# Patient Record
Sex: MALE | Race: WHITE | NOT HISPANIC OR LATINO | Employment: PART TIME | URBAN - METROPOLITAN AREA
[De-identification: names, ages, dates, MRNs, and addresses within clinical notes are randomized per-mention and may not be internally consistent; named-entity substitution may affect disease eponyms.]

---

## 2019-02-22 ENCOUNTER — OFFICE VISIT (OUTPATIENT)
Dept: GASTROENTEROLOGY | Facility: CLINIC | Age: 59
End: 2019-02-22
Payer: COMMERCIAL

## 2019-02-22 VITALS
SYSTOLIC BLOOD PRESSURE: 148 MMHG | HEIGHT: 70 IN | WEIGHT: 256 LBS | HEART RATE: 76 BPM | BODY MASS INDEX: 36.65 KG/M2 | DIASTOLIC BLOOD PRESSURE: 94 MMHG

## 2019-02-22 DIAGNOSIS — R10.13 EPIGASTRIC PAIN: Primary | ICD-10-CM

## 2019-02-22 DIAGNOSIS — K76.0 FATTY LIVER: ICD-10-CM

## 2019-02-22 DIAGNOSIS — Z12.11 COLON CANCER SCREENING: ICD-10-CM

## 2019-02-22 DIAGNOSIS — K29.50 CHRONIC GASTRITIS WITHOUT BLEEDING, UNSPECIFIED GASTRITIS TYPE: ICD-10-CM

## 2019-02-22 DIAGNOSIS — Z86.010 HISTORY OF COLON POLYPS: ICD-10-CM

## 2019-02-22 DIAGNOSIS — K21.9 GASTROESOPHAGEAL REFLUX DISEASE WITHOUT ESOPHAGITIS: ICD-10-CM

## 2019-02-22 PROCEDURE — 99214 OFFICE O/P EST MOD 30 MIN: CPT | Performed by: INTERNAL MEDICINE

## 2019-02-22 RX ORDER — PANTOPRAZOLE SODIUM 40 MG/1
40 TABLET, DELAYED RELEASE ORAL DAILY
Refills: 6 | COMMUNITY
Start: 2019-01-26 | End: 2019-02-22 | Stop reason: SDUPTHER

## 2019-02-22 RX ORDER — LISINOPRIL 10 MG/1
10 TABLET ORAL DAILY
Refills: 11 | COMMUNITY
Start: 2019-01-13

## 2019-02-22 RX ORDER — PAROXETINE HYDROCHLORIDE 20 MG/1
20 TABLET, FILM COATED ORAL
Refills: 2 | COMMUNITY
Start: 2019-01-23

## 2019-02-22 RX ORDER — PANTOPRAZOLE SODIUM 40 MG/1
40 TABLET, DELAYED RELEASE ORAL DAILY
Qty: 30 TABLET | Refills: 12 | Status: SHIPPED | OUTPATIENT
Start: 2019-02-22 | End: 2020-12-23 | Stop reason: SDUPTHER

## 2019-02-22 RX ORDER — FAMOTIDINE 40 MG/1
40 TABLET, FILM COATED ORAL
Qty: 30 TABLET | Refills: 12 | Status: SHIPPED | OUTPATIENT
Start: 2019-02-22 | End: 2021-02-10

## 2019-02-22 RX ORDER — METOPROLOL SUCCINATE 25 MG/1
12.5 TABLET, EXTENDED RELEASE ORAL DAILY
Refills: 1 | COMMUNITY
Start: 2019-01-07

## 2019-02-22 RX ORDER — SUCRALFATE 1 G/1
TABLET ORAL
Refills: 3 | COMMUNITY
Start: 2019-01-26 | End: 2019-03-26 | Stop reason: SDUPTHER

## 2019-02-22 RX ORDER — FAMOTIDINE 40 MG/1
40 TABLET, FILM COATED ORAL
Refills: 5 | COMMUNITY
Start: 2019-01-26 | End: 2019-02-22 | Stop reason: SDUPTHER

## 2019-02-22 NOTE — LETTER
February 22, 2019     Lucia Baires MD  [address] [place] [address]    Patient: Jaelyn Parry   YOB: 1960   Date of Visit: 2/22/2019       Dear Dr Daphne Mejia: Thank you for referring Jaelyn Parry to me for evaluation  Below are my notes for this consultation  If you have questions, please do not hesitate to call me  I look forward to following your patient along with you  Sincerely,        Aurelia Gonzales MD        CC: No Recipients  Aurelia Gonzales MD  2/22/2019  2:53 PM  Incomplete  [facility] - Outpatient Follow-up Note  Jaelyn Parry 63 y o  male MRN: [id number]  Encounter: [id number]      ASSESSMENT AND PLAN:      1  Epigastric pain  Patient is much improved from last visit in late November  At that time we initiated Carafate  He had epigastric abdominal pain  This is likely related to gastritis  He had been taking the Carafate 3 times a day but has cut back to 1 or 2 at this time  We will review new his prescription when needed    2  Gastroesophageal reflux disease without esophagitis  Controlled on present medications will refill  Will check kidney function on long-term PPI he does not do well without this D take the Pepcid every night medical regimen    - pantoprazole (PROTONIX) 40 mg tablet; Take 1 tablet (40 mg total) by mouth daily  Dispense: 30 tablet; Refill: 12  - famotidine (PEPCID) 40 MG tablet; Take 1 tablet (40 mg total) by mouth daily at bedtime  Dispense: 30 tablet; Refill: 12    3  Colon cancer screening    Patient due for follow-up this fall  Will sign recall letter    4  History of colon polyps  The patient had a 9 mm polyp noted on last examination in 2014  See above recall fall 2019    5  Chronic gastritis without bleeding, unspecified gastritis type  Please see heading under epigastric pain  I believe the patient has gastritis was causing the pain and Carafate helped relieve it    He is doing well presently no further intervention or workup needed at this time    6  Fatty liver  Recent studies in the fall showed very mild elevation of the ALT in the mid 40s  Patient has been more physically active keeping his weight down so patient not  Likely at risk for major issues  - Comprehensive metabolic panel; Future  - Comprehensive metabolic panel      Followup Appointment[de-identified]  1 year  ______________________________________________________________________    Chief Complaint   Patient presents with    Follow-up     Gastroparesis    Abdominal pain    HPI:  The patient Luis Gutiérrez returns to the office for follow-up visit with respect to his issues with abdominal pain  He was having quite a bit of epigastric abdominal pain last fall  Upper endoscopy in 2014 did reveal some moderate chronic gastritis  His status scoping him we decided to just empirically place him on Carafate  This seemed to do the trick any improved rapidly  He was on 3 times a day but has scale down to once to twice a day  He has no further nausea  He does have issues with chronic heartburn but this is under good control provided he continues on his pantoprazole and Pepcid  Overall he is doing well and try to exercise a little bit more and watch his diet        Historical Information   Past Medical History:   Diagnosis Date    Anxiety     Colon polyp     Depression     Fatty liver     GERD (gastroesophageal reflux disease)     Hyperlipidemia     Hypertension     Obstructive sleep apnea      Past Surgical History:   Procedure Laterality Date    CHOLECYSTECTOMY  2002     Social History   Social History     Substance and Sexual Activity   Alcohol Use Yes    Frequency: Monthly or less    Drinks per session: 1 or 2    Binge frequency: Never     Social History     Substance and Sexual Activity   Drug Use Never     Social History     Tobacco Use   Smoking Status Never Smoker   Smokeless Tobacco Never Used     Family History   Problem Relation Age of Onset    Diabetes Mother     Diabetes Father     Early death Brother     Heart attack Brother        Meds/Allergies       Current Outpatient Medications:     famotidine (PEPCID) 40 MG tablet    lisinopril (ZESTRIL) 10 mg tablet    metoprolol succinate (TOPROL-XL) 25 mg 24 hr tablet    pantoprazole (PROTONIX) 40 mg tablet    PARoxetine (PAXIL) 20 mg tablet    sucralfate (CARAFATE) 1 g tablet    Allergies no known allergies    10 Point REVIEW OF SYSTEMS reviewed multiple systems respiratory and cardiac no chest pain shortness of breath,  no dysuria or nocturia musculoskeletal no major problems with arthritis--psychiatric under good control on medications as well the rest of the review of systems is IS OTHERWISE NEGATIVE  Objective     Blood pressure 148/94, pulse 76, height 5' 10" (1 778 m), weight 116 kg (256 lb)  Body mass index is 36 73 kg/m²  PHYSICAL EXAM:    General Appearance:  Alert, cooperative, no distress  HEENT:  Normocephalic, atraumatic, anicteric  Neck: Supple, symmetrical, trachea midline  Lungs: Clear to auscultation bilaterally; no rales, rhonchi or wheezing; respirations unlabored   Heart: Regular rate and rhythm; no murmur, rub, or gallop  Abdomen:   Soft, non-tender, non-distended; normal bowel sounds; no masses, no organomegaly   Rectal:  Deferred   Extremities:  No cyanosis, clubbing or edema   Skin:  No jaundice, rashes, or lesions   Lymph nodes: No palpable cervical lymphadenopathy     Lab Results: We have to do it appeared ALT 46 normal 44 AST 33 normal range alk-phos 102 bilirubin 0 4 albumin 4 4      Radiology Results:   No results found  Mitchell Burleson MD  2/22/2019  2:52 PM  Sign at close encounter  [facility] - Outpatient Follow-up Note  Cayetano Reyes 63 y o  male MRN: [id number]  Encounter: [id number]      ASSESSMENT AND PLAN:      1  Epigastric pain  Patient is much improved from last visit in late November    At that time we initiated Carafate  He had epigastric abdominal pain  This is likely related to gastritis  He had been taking the Carafate 3 times a day but has cut back to 1 or 2 at this time  We will review new his prescription when needed    2  Gastroesophageal reflux disease without esophagitis  Controlled on present medications will refill  Will check kidney function on long-term PPI he does not do well without this D take the Pepcid every night medical regimen    - pantoprazole (PROTONIX) 40 mg tablet; Take 1 tablet (40 mg total) by mouth daily  Dispense: 30 tablet; Refill: 12  - famotidine (PEPCID) 40 MG tablet; Take 1 tablet (40 mg total) by mouth daily at bedtime  Dispense: 30 tablet; Refill: 12    3  Colon cancer screening    Patient due for follow-up this fall  Will sign recall letter    4  History of colon polyps  The patient had a 9 mm polyp noted on last examination in 2014  See above recall fall 2019    5  Chronic gastritis without bleeding, unspecified gastritis type  Please see heading under epigastric pain  I believe the patient has gastritis was causing the pain and Carafate helped relieve it  He is doing well presently no further intervention or workup needed at this time    6  Fatty liver  Recent studies in the fall showed very mild elevation of the ALT in the mid 40s  Patient has been more physically active keeping his weight down so patient not  Likely at risk for major issues  - Comprehensive metabolic panel; Future  - Comprehensive metabolic panel      Followup Appointment[de-identified]  1 year  ______________________________________________________________________    Chief Complaint   Patient presents with    Follow-up     Gastroparesis    Abdominal pain    HPI:  The patient Ethan Carmona returns to the office for follow-up visit with respect to his issues with abdominal pain  He was having quite a bit of epigastric abdominal pain last fall  Upper endoscopy in 2014 did reveal some moderate chronic gastritis    His status scoping him we decided to just empirically place him on Carafate  This seemed to do the trick any improved rapidly  He was on 3 times a day but has scale down to once to twice a day  He has no further nausea  He does have issues with chronic heartburn but this is under good control provided he continues on his pantoprazole and Pepcid  Overall he is doing well and try to exercise a little bit more and watch his diet  Historical Information   Past Medical History:   Diagnosis Date    Anxiety     Colon polyp     Depression     Fatty liver     GERD (gastroesophageal reflux disease)     Hyperlipidemia     Hypertension     Obstructive sleep apnea      Past Surgical History:   Procedure Laterality Date    CHOLECYSTECTOMY  2002     Social History   Social History     Substance and Sexual Activity   Alcohol Use Yes    Frequency: Monthly or less    Drinks per session: 1 or 2    Binge frequency: Never     Social History     Substance and Sexual Activity   Drug Use Never     Social History     Tobacco Use   Smoking Status Never Smoker   Smokeless Tobacco Never Used     Family History   Problem Relation Age of Onset    Diabetes Mother     Diabetes Father     Early death Brother     Heart attack Brother        Meds/Allergies       Current Outpatient Medications:     famotidine (PEPCID) 40 MG tablet    lisinopril (ZESTRIL) 10 mg tablet    metoprolol succinate (TOPROL-XL) 25 mg 24 hr tablet    pantoprazole (PROTONIX) 40 mg tablet    PARoxetine (PAXIL) 20 mg tablet    sucralfate (CARAFATE) 1 g tablet    Allergies no known allergies    10 Point REVIEW OF SYSTEMS reviewed multiple systems respiratory and cardiac no chest pain shortness of breath,  no dysuria or nocturia musculoskeletal no major problems with arthritis--psychiatric under good control on medications as well the rest of the review of systems is IS OTHERWISE NEGATIVE      Objective     Blood pressure 148/94, pulse 76, height 5' 10" (1 778 m), weight 116 kg (256 lb)  Body mass index is 36 73 kg/m²  PHYSICAL EXAM:    General Appearance:  Alert, cooperative, no distress  HEENT:  Normocephalic, atraumatic, anicteric  Neck: Supple, symmetrical, trachea midline  Lungs: Clear to auscultation bilaterally; no rales, rhonchi or wheezing; respirations unlabored   Heart: Regular rate and rhythm; no murmur, rub, or gallop  Abdomen:   Soft, non-tender, non-distended; normal bowel sounds; no masses, no organomegaly   Rectal:  Deferred   Extremities:  No cyanosis, clubbing or edema   Skin:  No jaundice, rashes, or lesions   Lymph nodes: No palpable cervical lymphadenopathy     Lab Results: We have to do it appeared ALT 46 normal 44 AST 33 normal range alk-phos 102 bilirubin 0 4 albumin 4 4      Radiology Results:   No results found

## 2019-02-22 NOTE — PATIENT INSTRUCTIONS
[facility] - Outpatient Follow-up Note  Cuca Potter 63 y o  male MRN: [id number]  Encounter: [id number]      ASSESSMENT AND PLAN:      1  Epigastric pain  Patient is much improved from last visit in late November  At that time we initiated Carafate  He had epigastric abdominal pain  This is likely related to gastritis  He had been taking the Carafate 3 times a day but has cut back to 1 or 2 at this time  We will review new his prescription when needed    2  Gastroesophageal reflux disease without esophagitis  Controlled on present medications will refill  Will check kidney function on long-term PPI he does not do well without this D take the Pepcid every night medical regimen    - pantoprazole (PROTONIX) 40 mg tablet; Take 1 tablet (40 mg total) by mouth daily  Dispense: 30 tablet; Refill: 12  - famotidine (PEPCID) 40 MG tablet; Take 1 tablet (40 mg total) by mouth daily at bedtime  Dispense: 30 tablet; Refill: 12    3  Colon cancer screening    Patient due for follow-up this fall  Will sign recall letter    4  History of colon polyps  The patient had a 9 mm polyp noted on last examination in 2014  See above recall fall 2019    5  Chronic gastritis without bleeding, unspecified gastritis type  Please see heading under epigastric pain  I believe the patient has gastritis was causing the pain and Carafate helped relieve it  He is doing well presently no further intervention or workup needed at this time    6  Fatty liver  Recent studies in the fall showed very mild elevation of the ALT in the mid 40s  Patient has been more physically active keeping his weight down so patient not  Likely at risk for major issues  - Comprehensive metabolic panel;  Future  - Comprehensive metabolic pa

## 2019-02-22 NOTE — PROGRESS NOTES
[facility] - Outpatient Follow-up Note  Francis Badillo 63 y o  male MRN: [id number]  Encounter: [id number]      ASSESSMENT AND PLAN:      1  Epigastric pain  Patient is much improved from last visit in late November  At that time we initiated Carafate  He had epigastric abdominal pain  This is likely related to gastritis  He had been taking the Carafate 3 times a day but has cut back to 1 or 2 at this time  We will review new his prescription when needed    2  Gastroesophageal reflux disease without esophagitis  Controlled on present medications will refill  Will check kidney function on long-term PPI he does not do well without this D take the Pepcid every night medical regimen    - pantoprazole (PROTONIX) 40 mg tablet; Take 1 tablet (40 mg total) by mouth daily  Dispense: 30 tablet; Refill: 12  - famotidine (PEPCID) 40 MG tablet; Take 1 tablet (40 mg total) by mouth daily at bedtime  Dispense: 30 tablet; Refill: 12    3  Colon cancer screening    Patient due for follow-up this fall  Will sign recall letter    4  History of colon polyps  The patient had a 9 mm polyp noted on last examination in 2014  See above recall fall 2019    5  Chronic gastritis without bleeding, unspecified gastritis type  Please see heading under epigastric pain  I believe the patient has gastritis was causing the pain and Carafate helped relieve it  He is doing well presently no further intervention or workup needed at this time    6  Fatty liver  Recent studies in the fall showed very mild elevation of the ALT in the mid 40s  Patient has been more physically active keeping his weight down so patient not  Likely at risk for major issues  - Comprehensive metabolic panel;  Future  - Comprehensive metabolic panel      Followup Appointment[de-identified]  1 year  ______________________________________________________________________    Chief Complaint   Patient presents with    Follow-up     Gastroparesis Abdominal pain    HPI:  The patient Ederia January returns to the office for follow-up visit with respect to his issues with abdominal pain  He was having quite a bit of epigastric abdominal pain last fall  Upper endoscopy in 2014 did reveal some moderate chronic gastritis  His status scoping him we decided to just empirically place him on Carafate  This seemed to do the trick any improved rapidly  He was on 3 times a day but has scale down to once to twice a day  He has no further nausea  He does have issues with chronic heartburn but this is under good control provided he continues on his pantoprazole and Pepcid  Overall he is doing well and try to exercise a little bit more and watch his diet        Historical Information   Past Medical History:   Diagnosis Date    Anxiety     Colon polyp     Depression     Fatty liver     GERD (gastroesophageal reflux disease)     Hyperlipidemia     Hypertension     Obstructive sleep apnea      Past Surgical History:   Procedure Laterality Date    CHOLECYSTECTOMY  2002     Social History   Social History     Substance and Sexual Activity   Alcohol Use Yes    Frequency: Monthly or less    Drinks per session: 1 or 2    Binge frequency: Never     Social History     Substance and Sexual Activity   Drug Use Never     Social History     Tobacco Use   Smoking Status Never Smoker   Smokeless Tobacco Never Used     Family History   Problem Relation Age of Onset    Diabetes Mother     Diabetes Father     Early death Brother     Heart attack Brother        Meds/Allergies       Current Outpatient Medications:     famotidine (PEPCID) 40 MG tablet    lisinopril (ZESTRIL) 10 mg tablet    metoprolol succinate (TOPROL-XL) 25 mg 24 hr tablet    pantoprazole (PROTONIX) 40 mg tablet    PARoxetine (PAXIL) 20 mg tablet    sucralfate (CARAFATE) 1 g tablet    Allergies no known allergies    10 Point REVIEW OF SYSTEMS reviewed multiple systems respiratory and cardiac no chest pain shortness of breath,  no dysuria or nocturia musculoskeletal no major problems with arthritis--psychiatric under good control on medications as well the rest of the review of systems is IS OTHERWISE NEGATIVE  Objective     Blood pressure 148/94, pulse 76, height 5' 10" (1 778 m), weight 116 kg (256 lb)  Body mass index is 36 73 kg/m²  PHYSICAL EXAM:    General Appearance:  Alert, cooperative, no distress  HEENT:  Normocephalic, atraumatic, anicteric  Neck: Supple, symmetrical, trachea midline  Lungs: Clear to auscultation bilaterally; no rales, rhonchi or wheezing; respirations unlabored   Heart: Regular rate and rhythm; no murmur, rub, or gallop  Abdomen:   Soft, non-tender, non-distended; normal bowel sounds; no masses, no organomegaly   Rectal:  Deferred   Extremities:  No cyanosis, clubbing or edema   Skin:  No jaundice, rashes, or lesions   Lymph nodes: No palpable cervical lymphadenopathy     Lab Results: We have to do it appeared ALT 46 normal 44 AST 33 normal range alk-phos 102 bilirubin 0 4 albumin 4 4      Radiology Results:   No results found

## 2019-03-26 DIAGNOSIS — K29.50 CHRONIC GASTRITIS WITHOUT BLEEDING, UNSPECIFIED GASTRITIS TYPE: Primary | ICD-10-CM

## 2019-03-28 RX ORDER — SUCRALFATE 1 G/1
TABLET ORAL
Qty: 90 TABLET | Refills: 3 | Status: SHIPPED | OUTPATIENT
Start: 2019-03-28 | End: 2019-07-24 | Stop reason: SDUPTHER

## 2019-07-24 DIAGNOSIS — K29.50 CHRONIC GASTRITIS WITHOUT BLEEDING, UNSPECIFIED GASTRITIS TYPE: ICD-10-CM

## 2019-07-24 RX ORDER — SUCRALFATE 1 G/1
TABLET ORAL
Qty: 90 TABLET | Refills: 3 | Status: SHIPPED | OUTPATIENT
Start: 2019-07-24 | End: 2019-11-03 | Stop reason: SDUPTHER

## 2019-11-03 DIAGNOSIS — K29.50 CHRONIC GASTRITIS WITHOUT BLEEDING, UNSPECIFIED GASTRITIS TYPE: ICD-10-CM

## 2019-11-03 RX ORDER — SUCRALFATE 1 G/1
TABLET ORAL
Qty: 90 TABLET | Refills: 3 | Status: SHIPPED | OUTPATIENT
Start: 2019-11-03 | End: 2020-03-17 | Stop reason: SDUPTHER

## 2020-03-17 DIAGNOSIS — K29.50 CHRONIC GASTRITIS WITHOUT BLEEDING, UNSPECIFIED GASTRITIS TYPE: ICD-10-CM

## 2020-03-17 RX ORDER — SUCRALFATE 1 G/1
TABLET ORAL
Qty: 90 TABLET | Refills: 3 | Status: SHIPPED | OUTPATIENT
Start: 2020-03-17 | End: 2020-07-09

## 2020-07-09 DIAGNOSIS — K29.50 CHRONIC GASTRITIS WITHOUT BLEEDING, UNSPECIFIED GASTRITIS TYPE: ICD-10-CM

## 2020-07-09 RX ORDER — SUCRALFATE 1 G/1
TABLET ORAL
Qty: 90 TABLET | Refills: 3 | Status: SHIPPED | OUTPATIENT
Start: 2020-07-09 | End: 2020-11-18

## 2020-11-18 DIAGNOSIS — K29.50 CHRONIC GASTRITIS WITHOUT BLEEDING, UNSPECIFIED GASTRITIS TYPE: ICD-10-CM

## 2020-11-18 RX ORDER — SUCRALFATE 1 G/1
TABLET ORAL
Qty: 90 TABLET | Refills: 3 | Status: SHIPPED | OUTPATIENT
Start: 2020-11-18 | End: 2021-02-10 | Stop reason: SDUPTHER

## 2020-12-23 DIAGNOSIS — K21.9 GASTROESOPHAGEAL REFLUX DISEASE WITHOUT ESOPHAGITIS: ICD-10-CM

## 2020-12-23 RX ORDER — PANTOPRAZOLE SODIUM 40 MG/1
40 TABLET, DELAYED RELEASE ORAL DAILY
Qty: 30 TABLET | Refills: 2 | Status: SHIPPED | OUTPATIENT
Start: 2020-12-23 | End: 2021-03-18

## 2021-01-19 LAB — HBA1C MFR BLD HPLC: 5.5 %

## 2021-02-10 ENCOUNTER — OFFICE VISIT (OUTPATIENT)
Dept: GASTROENTEROLOGY | Facility: CLINIC | Age: 61
End: 2021-02-10
Payer: COMMERCIAL

## 2021-02-10 VITALS
SYSTOLIC BLOOD PRESSURE: 148 MMHG | BODY MASS INDEX: 30.78 KG/M2 | DIASTOLIC BLOOD PRESSURE: 90 MMHG | HEIGHT: 70 IN | WEIGHT: 215 LBS

## 2021-02-10 DIAGNOSIS — K29.50 CHRONIC GASTRITIS WITHOUT BLEEDING, UNSPECIFIED GASTRITIS TYPE: ICD-10-CM

## 2021-02-10 DIAGNOSIS — Z86.010 HISTORY OF COLON POLYPS: ICD-10-CM

## 2021-02-10 DIAGNOSIS — K21.9 GASTROESOPHAGEAL REFLUX DISEASE WITHOUT ESOPHAGITIS: Primary | ICD-10-CM

## 2021-02-10 DIAGNOSIS — K76.0 FATTY LIVER: ICD-10-CM

## 2021-02-10 PROCEDURE — 99213 OFFICE O/P EST LOW 20 MIN: CPT | Performed by: INTERNAL MEDICINE

## 2021-02-10 RX ORDER — SUCRALFATE 1 G/1
TABLET ORAL
Qty: 90 TABLET | Refills: 3 | Status: SHIPPED | OUTPATIENT
Start: 2021-02-10 | End: 2021-07-21 | Stop reason: SDUPTHER

## 2021-02-10 NOTE — LETTER
February 10, 2021     Solo Viera MD  [place] [facility] [address]    Patient: Ruma High   YOB: 1960   Date of Visit: 2/10/2021       Dear Dr Luis Leyva: Thank you for referring Ruma High to me for evaluation  Below are my notes for this consultation  If you have questions, please do not hesitate to call me  I look forward to following your patient along with you  Sincerely,        Amee García MD        CC: No Recipients  Amee García MD  2/10/2021  2:49 PM  Incomplete  [facility] - Outpatient Follow-up Note  Ruma High 62 y o  male MRN: [id number]  Encounter: [id number]    ASSESSMENT AND PLAN:      1  Gastroesophageal reflux disease without esophagitis  - patient with GERD without esophagitis  Overall doing well when he watches his diet and takes his pantoprazole  Does need to take Pepcid anymore  Continue pantoprazole 40 mg daily      2  Chronic gastritis without bleeding, unspecified gastritis type  -- patient with a history of chronic gastritis-  - patient maintained just on 1 Carafate today along with the pantoprazole  Continue the same  A lot of discomfort from the gastritis is dietary related  Patient knows to avoid the fatty meats and lettuce    3  Fatty liver  -- noted on previous imaging  Review of liver function studies from January all within normal limits  No elevation of  liver enzymes    4  History of colon polyps  - up-to-date  Patient's last colonoscopy fall 2017  He would be due for colonoscopy this November  Last examination did not reveal any polyps  First examination revealed a 9 mm adenoma      Followup Appointment: 1 year   ______________________________________________________________________    Chief Complaint   Patient presents with    Medication Management     HPI:     patient returns to the office for follow-up visit with respect to gastrointestinal issues    Does have a history of gastroesophageal reflux along with chronic gastritis and epigastric abdominal pain  Patient has had previous upper endoscopies  He does not have esophagitis  Long as he watches his diet takes pantoprazole he does well  Takes Carafate once a day stomach issues  As long as he does as he does  He does take pantoprazole  He ran out of his prescriptions and had been seen in the office in almost 2 years we into the office  The no other issues going on at this time  He is up-to-date with respect colon cancer screening  He does have of adenomatous polyps  Last examination was favorable in 2017- been planning on a 5 year recall examination    Historical Information   Past Medical History:   Diagnosis Date    Anxiety     Colon polyp     Depression     Fatty liver     GERD (gastroesophageal reflux disease)     Hyperlipidemia     Hypertension     Obstructive sleep apnea      Past Surgical History:   Procedure Laterality Date    CHOLECYSTECTOMY  2002     Social History     Substance and Sexual Activity   Alcohol Use Yes    Frequency: Monthly or less    Drinks per session: 1 or 2    Binge frequency: Never     Social History     Substance and Sexual Activity   Drug Use Never     Social History     Tobacco Use   Smoking Status Never Smoker   Smokeless Tobacco Never Used     Family History   Problem Relation Age of Onset    Diabetes Mother     Diabetes Father     Early death Brother     Heart attack Brother          Current Outpatient Medications:     lisinopril (ZESTRIL) 10 mg tablet    metoprolol succinate (TOPROL-XL) 25 mg 24 hr tablet    pantoprazole (PROTONIX) 40 mg tablet    PARoxetine (PAXIL) 20 mg tablet    sucralfate (CARAFATE) 1 g tablet  No Known Allergies  Reviewed medications and allergies and updated as indicated   GI please see above  The rest of 10 point review of systems is negative    PHYSICAL EXAM:    Blood pressure 148/90, height 5' 10" (1 778 m), weight 97 5 kg (215 lb)   Body mass index is 30 85 kg/m²  General Appearance: NAD, cooperative, alert  Eyes: Anicteric,  Conjunctiva pink  ENT:  Normocephalic, atraumatic, normal mucosa  Neck:  Supple, symmetrical, trachea midline  Resp:  Clear to auscultation bilaterally; no rales, rhonchi or wheezing; respirations unlabored   CV:  S1 S2, Regular rate and rhythm; no murmur, rub, or gallop  GI:  Soft, non-tender, non-distended; normal bowel sounds; no masses, no organomegaly   Rectal: Deferred  Musculoskeletal: No cyanosis, clubbing or edema  Normal ROM  Skin:  No jaundice, rashes, or lesions   Heme/Lymph: No palpable cervical lymphadenopathy  Psych: Normal affect, good eye contact  Neuro: No gross deficits, AAOx3    Lab Results:    CBC CMP from lab [facility] all within normal range January 2021

## 2021-02-10 NOTE — PROGRESS NOTES
[id number] [facility] - Outpatient Follow-up Note  Esvin Castro 62 y o  male MRN: [id number]  Encounter: [id number]    ASSESSMENT AND PLAN:      1  Gastroesophageal reflux disease without esophagitis  - patient with GERD without esophagitis  Overall doing well when he watches his diet and takes his pantoprazole  Does need to take Pepcid anymore  Continue pantoprazole 40 mg daily      2  Chronic gastritis without bleeding, unspecified gastritis type  -- patient with a history of chronic gastritis-  - patient maintained just on 1 Carafate today along with the pantoprazole  Continue the same  A lot of discomfort from the gastritis is dietary related  Patient knows to avoid the fatty meats and lettuce    3  Fatty liver  -- noted on previous imaging  Review of liver function studies from January all within normal limits  No elevation of  liver enzymes    4  History of colon polyps  - up-to-date  Patient's last colonoscopy fall 2017  He would be due for colonoscopy this November  Last examination did not reveal any polyps  First examination revealed a 9 mm adenoma      Followup Appointment: 1 year   ______________________________________________________________________    Chief Complaint   Patient presents with    Medication Management     HPI:     patient returns to the office for follow-up visit with respect to gastrointestinal issues  Does have a history of gastroesophageal reflux along with chronic gastritis and epigastric abdominal pain  Patient has had previous upper endoscopies  He does not have esophagitis  Long as he watches his diet takes pantoprazole he does well  Takes Carafate once a day stomach issues  As long as he does as he does  He does take pantoprazole  He ran out of his prescriptions and had been seen in the office in almost 2 years we into the office  The no other issues going on at this time  He is up-to-date with respect colon cancer screening    He does have of adenomatous polyps  Last examination was favorable in 2017- been planning on a 5 year recall examination    Historical Information   Past Medical History:   Diagnosis Date    Anxiety     Colon polyp     Depression     Fatty liver     GERD (gastroesophageal reflux disease)     Hyperlipidemia     Hypertension     Obstructive sleep apnea      Past Surgical History:   Procedure Laterality Date    CHOLECYSTECTOMY  2002     Social History     Substance and Sexual Activity   Alcohol Use Yes    Frequency: Monthly or less    Drinks per session: 1 or 2    Binge frequency: Never     Social History     Substance and Sexual Activity   Drug Use Never     Social History     Tobacco Use   Smoking Status Never Smoker   Smokeless Tobacco Never Used     Family History   Problem Relation Age of Onset    Diabetes Mother     Diabetes Father     Early death Brother     Heart attack Brother          Current Outpatient Medications:     lisinopril (ZESTRIL) 10 mg tablet    metoprolol succinate (TOPROL-XL) 25 mg 24 hr tablet    pantoprazole (PROTONIX) 40 mg tablet    PARoxetine (PAXIL) 20 mg tablet    sucralfate (CARAFATE) 1 g tablet  No Known Allergies  Reviewed medications and allergies and updated as indicated   GI please see above  The rest of 10 point review of systems is negative    PHYSICAL EXAM:    Blood pressure 148/90, height 5' 10" (1 778 m), weight 97 5 kg (215 lb)  Body mass index is 30 85 kg/m²  General Appearance: NAD, cooperative, alert  Eyes: Anicteric,  Conjunctiva pink  ENT:  Normocephalic, atraumatic, normal mucosa  Neck:  Supple, symmetrical, trachea midline  Resp:  Clear to auscultation bilaterally; no rales, rhonchi or wheezing; respirations unlabored   CV:  S1 S2, Regular rate and rhythm; no murmur, rub, or gallop  GI:  Soft, non-tender, non-distended; normal bowel sounds; no masses, no organomegaly   Rectal: Deferred  Musculoskeletal: No cyanosis, clubbing or edema   Normal ROM   Skin:  No jaundice, rashes, or lesions   Heme/Lymph: No palpable cervical lymphadenopathy  Psych: Normal affect, good eye contact  Neuro: No gross deficits, AAOx3    Lab Results:    CBC CMP from lab [facility] all within normal range January 2021

## 2021-02-10 NOTE — PATIENT INSTRUCTIONS
[id number] - Outpatient Follow-up Note  Fredo Doran 62 y o  male MRN: [id number]  Encounter: [id number]    ASSESSMENT AND PLAN:      1  Gastroesophageal reflux disease without esophagitis  - patient with GERD without esophagitis  Overall doing well when he watches his diet and takes his pantoprazole  Does need to take Pepcid anymore  Continue pantoprazole 40 mg daily      2  Chronic gastritis without bleeding, unspecified gastritis type  -- patient with a history of chronic gastritis-  - patient maintained just on 1 Carafate today along with the pantoprazole  Continue the same  A lot of discomfort from the gastritis is dietary related  Patient knows to avoid the fatty meats and lettuce    3  Fatty liver  -- noted on previous imaging  Review of liver function studies from January all within normal limits  No elevation of  liver enzymes    4  History of colon polyps  - up-to-date  Patient's last colonoscopy fall 2017  He would be due for colonoscopy this November  Last examination did not reveal any polyps    First examination revealed a 9 mm adenoma      Followup Appointment: 1 year

## 2021-03-18 DIAGNOSIS — K21.9 GASTROESOPHAGEAL REFLUX DISEASE WITHOUT ESOPHAGITIS: ICD-10-CM

## 2021-03-18 RX ORDER — PANTOPRAZOLE SODIUM 40 MG/1
TABLET, DELAYED RELEASE ORAL
Qty: 30 TABLET | Refills: 2 | Status: SHIPPED | OUTPATIENT
Start: 2021-03-18 | End: 2021-06-17

## 2021-06-17 DIAGNOSIS — K21.9 GASTROESOPHAGEAL REFLUX DISEASE WITHOUT ESOPHAGITIS: ICD-10-CM

## 2021-06-17 RX ORDER — PANTOPRAZOLE SODIUM 40 MG/1
TABLET, DELAYED RELEASE ORAL
Qty: 30 TABLET | Refills: 2 | Status: SHIPPED | OUTPATIENT
Start: 2021-06-17 | End: 2021-09-16

## 2021-07-21 DIAGNOSIS — K29.50 CHRONIC GASTRITIS WITHOUT BLEEDING, UNSPECIFIED GASTRITIS TYPE: ICD-10-CM

## 2021-07-21 RX ORDER — SUCRALFATE 1 G/1
1 TABLET ORAL 3 TIMES DAILY
Qty: 90 TABLET | Refills: 11 | Status: SHIPPED | OUTPATIENT
Start: 2021-07-21 | End: 2022-06-16

## 2021-07-21 NOTE — TELEPHONE ENCOUNTER
Nancie Cotter from [facility] states Pt is there for refill; needs new Rx for Sulcrafate 1 gm tabs, 3 times daily for quantity of 90  If Lizbet Drummond # [id number]

## 2021-09-16 DIAGNOSIS — K21.9 GASTROESOPHAGEAL REFLUX DISEASE WITHOUT ESOPHAGITIS: ICD-10-CM

## 2021-09-16 RX ORDER — PANTOPRAZOLE SODIUM 40 MG/1
TABLET, DELAYED RELEASE ORAL
Qty: 30 TABLET | Refills: 3 | Status: SHIPPED | OUTPATIENT
Start: 2021-09-16 | End: 2022-01-14

## 2022-01-14 DIAGNOSIS — K21.9 GASTROESOPHAGEAL REFLUX DISEASE WITHOUT ESOPHAGITIS: ICD-10-CM

## 2022-01-14 RX ORDER — PANTOPRAZOLE SODIUM 40 MG/1
TABLET, DELAYED RELEASE ORAL
Qty: 30 TABLET | Refills: 3 | Status: SHIPPED | OUTPATIENT
Start: 2022-01-14 | End: 2022-05-17

## 2022-06-16 DIAGNOSIS — K29.50 CHRONIC GASTRITIS WITHOUT BLEEDING, UNSPECIFIED GASTRITIS TYPE: ICD-10-CM

## 2022-06-16 RX ORDER — SUCRALFATE 1 G/1
TABLET ORAL
Qty: 90 TABLET | Refills: 11 | Status: SHIPPED | OUTPATIENT
Start: 2022-06-16 | End: 2022-07-21 | Stop reason: SDUPTHER

## 2022-07-21 ENCOUNTER — TELEPHONE (OUTPATIENT)
Dept: GASTROENTEROLOGY | Facility: CLINIC | Age: 62
End: 2022-07-21

## 2022-07-21 ENCOUNTER — OFFICE VISIT (OUTPATIENT)
Dept: GASTROENTEROLOGY | Facility: CLINIC | Age: 62
End: 2022-07-21
Payer: COMMERCIAL

## 2022-07-21 VITALS
BODY MASS INDEX: 30.26 KG/M2 | HEIGHT: 70 IN | DIASTOLIC BLOOD PRESSURE: 84 MMHG | SYSTOLIC BLOOD PRESSURE: 116 MMHG | WEIGHT: 211.4 LBS

## 2022-07-21 DIAGNOSIS — K76.0 FATTY LIVER: ICD-10-CM

## 2022-07-21 DIAGNOSIS — K21.9 GASTROESOPHAGEAL REFLUX DISEASE WITHOUT ESOPHAGITIS: ICD-10-CM

## 2022-07-21 DIAGNOSIS — Z86.010 HISTORY OF COLON POLYPS: Primary | ICD-10-CM

## 2022-07-21 DIAGNOSIS — K29.30 CHRONIC SUPERFICIAL GASTRITIS WITHOUT BLEEDING: ICD-10-CM

## 2022-07-21 DIAGNOSIS — K29.50 CHRONIC GASTRITIS WITHOUT BLEEDING, UNSPECIFIED GASTRITIS TYPE: ICD-10-CM

## 2022-07-21 PROCEDURE — 99214 OFFICE O/P EST MOD 30 MIN: CPT | Performed by: NURSE PRACTITIONER

## 2022-07-21 RX ORDER — PANTOPRAZOLE SODIUM 40 MG/1
40 TABLET, DELAYED RELEASE ORAL DAILY
Qty: 30 TABLET | Refills: 11 | Status: SHIPPED | OUTPATIENT
Start: 2022-07-21 | End: 2022-09-01

## 2022-07-21 RX ORDER — SODIUM PICOSULFATE, MAGNESIUM OXIDE, AND ANHYDROUS CITRIC ACID 10; 3.5; 12 MG/160ML; G/160ML; G/160ML
LIQUID ORAL
Qty: 320 ML | Refills: 0 | Status: SHIPPED | OUTPATIENT
Start: 2022-07-21 | End: 2022-09-01 | Stop reason: HOSPADM

## 2022-07-21 RX ORDER — SUCRALFATE 1 G/1
1 TABLET ORAL
Qty: 90 TABLET | Refills: 11 | Status: SHIPPED | OUTPATIENT
Start: 2022-07-21

## 2022-07-21 NOTE — LETTER
July 21, 2022     Dianna Chisholm MD  [place] [address]    Patient: Ricky Martinez   YOB: 1960   Date of Visit: 7/21/2022       Dear Dr Jaydon Alarcon: Thank you for referring Ricky Martinez to me for evaluation  Below are my notes for this consultation  If you have questions, please do not hesitate to call me  I look forward to following your patient along with you  Sincerely,        SAKINA Odell        CC: MD Emma Anthony, [address] [place] [address]  7/21/2022 10:47 AM  Sign when Signing Visit  [address] [facility] - Outpatient Follow-up Note  Ricky Martinez 62 y o  male MRN: [id number]  Encounter: [id number]    ASSESSMENT AND PLAN:      1  Gastroesophageal reflux disease   2  Chronic superficial gastritis without bleeding  Symptoms well controlled on pantoprazole 40 mg daily and sucralfate 1 g t i d  with meals  Experiences nausea and abdominal bloating with attempting to discontinue sucralfate  No breakthrough GERD symptoms  - continue pantoprazole 40 mg daily -  Refills provided  - continue sucralfate 1 g t i d -  Refills provided  - reviewed GERD diet and lifestyle modification    3  Fatty liver  Noted on previous imaging  LFTs within normal limits 01/2021  - check labs -CMP, fibrosis score, AFP  - abdominal ultrasound to re-evaluate    4  History of colon polyps  Prior colonoscopy fall 2017  Remote history of adenomatous polyps  Five year recall-due for surveillance  - scheduled for colonoscopy at [facility]      Followup Appointment:  1 year  ______________________________________________________________________    Chief Complaint   Patient presents with    Follow up GERD and gastritis     Needs refills  HPI:  Patient presents today in follow-up for history of GERD and gastritis    He reports that his symptoms are currently well controlled with taking pantoprazole 40 mg daily and sucralfate 1 g 3 times a day with meals  He does report that if he does not take sucralfate, he experiences abdominal bloating / discomfort and nausea  He denies any dysphagia  No breakthrough reflux/ heartburn symptoms  His appetite is good and his weight has been stable  Denies recent change in his bowel habits with formed bowel movement daily  Denies melena or rectal bleeding  He has a history of colon polyps, last colonoscopy was October 2017 and 5 year recall recommended    Historical Information   Past Medical History:   Diagnosis Date    Anxiety     Colon polyp     Depression     Fatty liver     GERD (gastroesophageal reflux disease)     Hyperlipidemia     Hypertension     Obstructive sleep apnea      Past Surgical History:   Procedure Laterality Date    CHOLECYSTECTOMY  2002     Social History     Substance and Sexual Activity   Alcohol Use Never     Social History     Substance and Sexual Activity   Drug Use Never     Social History     Tobacco Use   Smoking Status Never Smoker   Smokeless Tobacco Never Used     Family History   Problem Relation Age of Onset    Diabetes Mother     Diabetes Father     Early death Brother     Heart attack Brother     Colon cancer Paternal Grandmother          Current Outpatient Medications:     lisinopril (ZESTRIL) 10 mg tablet    metoprolol succinate (TOPROL-XL) 25 mg 24 hr tablet    pantoprazole (PROTONIX) 40 mg tablet    PARoxetine (PAXIL) 20 mg tablet    Rosuvastatin Calcium 5 MG CPSP    sucralfate (CARAFATE) 1 g tablet  No Known Allergies  Reviewed medications and allergies and updated as indicated    PHYSICAL EXAM:    Blood pressure 116/84, height 5' 10" (1 778 m), weight 95 9 kg (211 lb 6 4 oz)  Body mass index is 30 33 kg/m²  General Appearance: NAD, cooperative, alert  Eyes: Anicteric  ENT:  Normocephalic, atraumatic, normal mucosa      Neck:  Supple, symmetrical, trachea midline  Resp:  Clear to auscultation bilaterally; no rales, rhonchi or wheezing; respirations unlabored   CV:  S1 S2, Regular rate and rhythm; no murmur, rub, or gallop  GI:  Soft, non-tender, non-distended; normal bowel sounds; no masses, no organomegaly   Rectal: Deferred  Musculoskeletal: No cyanosis, clubbing or edema  Normal ROM    Skin:  No jaundice, rashes, or lesions     Psych: Normal affect, good eye contact  Neuro: No gross deficits, AAOx3

## 2022-07-21 NOTE — TELEPHONE ENCOUNTER
Scheduled date of colonoscopy (as of today): 9/1/22  Physician performing colonoscopy: Dr Mary Smith  Location of colonoscopy:[facility]  Bowel prep reviewed with patient:Clenpiq  Instructions reviewed with patient by:PD  Clearances: NONE

## 2022-07-21 NOTE — PROGRESS NOTES
[facility] - Outpatient Follow-up Note  Ricky Martinez 62 y o  male MRN: [id number]  Encounter: [id number]    ASSESSMENT AND PLAN:      1  Gastroesophageal reflux disease   2  Chronic superficial gastritis without bleeding  Symptoms well controlled on pantoprazole 40 mg daily and sucralfate 1 g t i d  with meals  Experiences nausea and abdominal bloating with attempting to discontinue sucralfate  No breakthrough GERD symptoms  - continue pantoprazole 40 mg daily -  Refills provided  - continue sucralfate 1 g t i d -  Refills provided  - reviewed GERD diet and lifestyle modification    3  Fatty liver  Noted on previous imaging  LFTs within normal limits 01/2021  - check labs -CMP, fibrosis score, AFP  - abdominal ultrasound to re-evaluate    4  History of colon polyps  Prior colonoscopy fall 2017  Remote history of adenomatous polyps  Five year recall-due for surveillance  - scheduled for colonoscopy at [facility]      Followup Appointment:  1 year  ______________________________________________________________________    Chief Complaint   Patient presents with    Follow up GERD and gastritis     Needs refills  HPI:  Patient presents today in follow-up for history of GERD and gastritis  He reports that his symptoms are currently well controlled with taking pantoprazole 40 mg daily and sucralfate 1 g 3 times a day with meals  He does report that if he does not take sucralfate, he experiences abdominal bloating / discomfort and nausea  He denies any dysphagia  No breakthrough reflux/ heartburn symptoms  His appetite is good and his weight has been stable  Denies recent change in his bowel habits with formed bowel movement daily  Denies melena or rectal bleeding    He has a history of colon polyps, last colonoscopy was October 2017 and 5 year recall recommended    Historical Information   Past Medical History:   Diagnosis Date    Anxiety     Colon polyp     Depression     Fatty liver     GERD (gastroesophageal reflux disease)     Hyperlipidemia     Hypertension     Obstructive sleep apnea      Past Surgical History:   Procedure Laterality Date    CHOLECYSTECTOMY  2002     Social History     Substance and Sexual Activity   Alcohol Use Never     Social History     Substance and Sexual Activity   Drug Use Never     Social History     Tobacco Use   Smoking Status Never Smoker   Smokeless Tobacco Never Used     Family History   Problem Relation Age of Onset    Diabetes Mother     Diabetes Father     Early death Brother     Heart attack Brother     Colon cancer Paternal Grandmother          Current Outpatient Medications:     lisinopril (ZESTRIL) 10 mg tablet    metoprolol succinate (TOPROL-XL) 25 mg 24 hr tablet    pantoprazole (PROTONIX) 40 mg tablet    PARoxetine (PAXIL) 20 mg tablet    Rosuvastatin Calcium 5 MG CPSP    sucralfate (CARAFATE) 1 g tablet  No Known Allergies  Reviewed medications and allergies and updated as indicated    PHYSICAL EXAM:    Blood pressure 116/84, height 5' 10" (1 778 m), weight 95 9 kg (211 lb 6 4 oz)  Body mass index is 30 33 kg/m²  General Appearance: NAD, cooperative, alert  Eyes: Anicteric  ENT:  Normocephalic, atraumatic, normal mucosa  Neck:  Supple, symmetrical, trachea midline  Resp:  Clear to auscultation bilaterally; no rales, rhonchi or wheezing; respirations unlabored   CV:  S1 S2, Regular rate and rhythm; no murmur, rub, or gallop  GI:  Soft, non-tender, non-distended; normal bowel sounds; no masses, no organomegaly   Rectal: Deferred  Musculoskeletal: No cyanosis, clubbing or edema  Normal ROM    Skin:  No jaundice, rashes, or lesions     Psych: Normal affect, good eye contact  Neuro: No gross deficits, AAOx3

## 2022-09-01 ENCOUNTER — ANESTHESIA EVENT (OUTPATIENT)
Dept: GASTROENTEROLOGY | Facility: AMBULATORY SURGERY CENTER | Age: 62
End: 2022-09-01

## 2022-09-01 ENCOUNTER — ANESTHESIA (OUTPATIENT)
Dept: GASTROENTEROLOGY | Facility: AMBULATORY SURGERY CENTER | Age: 62
End: 2022-09-01

## 2022-09-01 ENCOUNTER — HOSPITAL ENCOUNTER (OUTPATIENT)
Dept: GASTROENTEROLOGY | Facility: AMBULATORY SURGERY CENTER | Age: 62
Discharge: HOME | End: 2022-09-01
Payer: COMMERCIAL

## 2022-09-01 VITALS
HEART RATE: 75 BPM | OXYGEN SATURATION: 95 % | HEIGHT: 70 IN | BODY MASS INDEX: 30.49 KG/M2 | TEMPERATURE: 98.3 F | RESPIRATION RATE: 22 BRPM | DIASTOLIC BLOOD PRESSURE: 88 MMHG | WEIGHT: 213 LBS | SYSTOLIC BLOOD PRESSURE: 150 MMHG

## 2022-09-01 DIAGNOSIS — Z86.010 HISTORY OF COLON POLYPS: ICD-10-CM

## 2022-09-01 PROCEDURE — 45380 COLONOSCOPY AND BIOPSY: CPT | Performed by: INTERNAL MEDICINE

## 2022-09-01 RX ORDER — PROPOFOL 10 MG/ML
INJECTION, EMULSION INTRAVENOUS AS NEEDED
Status: DISCONTINUED | OUTPATIENT
Start: 2022-09-01 | End: 2022-09-01

## 2022-09-01 RX ORDER — LIDOCAINE HYDROCHLORIDE 10 MG/ML
INJECTION, SOLUTION EPIDURAL; INFILTRATION; INTRACAUDAL; PERINEURAL AS NEEDED
Status: DISCONTINUED | OUTPATIENT
Start: 2022-09-01 | End: 2022-09-01

## 2022-09-01 RX ORDER — SODIUM CHLORIDE, SODIUM LACTATE, POTASSIUM CHLORIDE, CALCIUM CHLORIDE 600; 310; 30; 20 MG/100ML; MG/100ML; MG/100ML; MG/100ML
50 INJECTION, SOLUTION INTRAVENOUS CONTINUOUS
Status: DISCONTINUED | OUTPATIENT
Start: 2022-09-01 | End: 2022-09-05 | Stop reason: HOSPADM

## 2022-09-01 RX ADMIN — PROPOFOL 20 MG: 10 INJECTION, EMULSION INTRAVENOUS at 14:16

## 2022-09-01 RX ADMIN — PROPOFOL 50 MG: 10 INJECTION, EMULSION INTRAVENOUS at 14:10

## 2022-09-01 RX ADMIN — SODIUM CHLORIDE, SODIUM LACTATE, POTASSIUM CHLORIDE, CALCIUM CHLORIDE 50 ML/HR: 600; 310; 30; 20 INJECTION, SOLUTION INTRAVENOUS at 13:28

## 2022-09-01 RX ADMIN — PROPOFOL 50 MG: 10 INJECTION, EMULSION INTRAVENOUS at 14:20

## 2022-09-01 RX ADMIN — PROPOFOL 50 MG: 10 INJECTION, EMULSION INTRAVENOUS at 14:27

## 2022-09-01 RX ADMIN — LIDOCAINE HYDROCHLORIDE 50 MG: 10 INJECTION, SOLUTION EPIDURAL; INFILTRATION; INTRACAUDAL; PERINEURAL at 14:08

## 2022-09-01 RX ADMIN — PROPOFOL 100 MG: 10 INJECTION, EMULSION INTRAVENOUS at 14:08

## 2022-09-01 RX ADMIN — PROPOFOL 30 MG: 10 INJECTION, EMULSION INTRAVENOUS at 14:12

## 2022-09-01 NOTE — H&P
History and Physical - [facility]  Audrey Sol 62 y o  male MRN: [id number]    HPI: Audrey Sol is a 62y o  year old male who presents for surveillance colonoscopy  He has a previous history of colon polyps    REVIEW OF SYSTEMS: Per the HPI, and otherwise unremarkable  Historical Information   Past Medical History:   Diagnosis Date    Anxiety     Colon polyp     Depression     Fatty liver     GERD (gastroesophageal reflux disease)     Hyperlipidemia     Hypertension     Obstructive sleep apnea      Past Surgical History:   Procedure Laterality Date    CHOLECYSTECTOMY  2002    COLONOSCOPY       Social History   Social History     Substance and Sexual Activity   Alcohol Use Never     Social History     Substance and Sexual Activity   Drug Use Never     Social History     Tobacco Use   Smoking Status Never Smoker   Smokeless Tobacco Never Used     Family History   Problem Relation Age of Onset    Diabetes Mother     Diabetes Father     Early death Brother     Heart attack Brother     Colon cancer Paternal Grandmother        Meds/Allergies       Current Outpatient Medications:     lisinopril (ZESTRIL) 10 mg tablet    metoprolol succinate (TOPROL-XL) 25 mg 24 hr tablet    pantoprazole (PROTONIX) 40 mg tablet    PARoxetine (PAXIL) 20 mg tablet    Rosuvastatin Calcium 5 MG CPSP    Sod Picosulfate-Mag Ox-Cit Acd (Clenpiq) 10-3 5-12 MG-GM -GM/160ML SOLN    sucralfate (CARAFATE) 1 g tablet    Current Facility-Administered Medications:     lactated ringers infusion, 50 mL/hr, Intravenous, Continuous    No Known Allergies    Objective     There were no vitals taken for this visit  PHYSICAL EXAM    Gen: NAD AAOx3  Head: Normocephalic, Atraumatic  CV: S1S2 RRR no m/r/g  CHEST: Clear b/l no c/r/w  ABD: soft, +BS NT/ND  EXT: no edema    ASSESSMENT/PLAN:  This is a 62y o  year old male here for surveillance colonoscopy, and he is stable and optimized for his procedure

## 2022-09-01 NOTE — ANESTHESIA POSTPROCEDURE EVALUATION
Post-Op Assessment Note    CV Status:  Stable  Pain Score: 0    Pain management: adequate     Mental Status:  Arousable   Hydration Status:  Stable   PONV Controlled:  None   Airway Patency:  Patent      Post Op Vitals Reviewed: Yes      Staff: CRNA         No complications documented      BP   112/68   Temp      Pulse  68   Resp   20   SpO2   94

## 2022-09-01 NOTE — ANESTHESIA PREPROCEDURE EVALUATION
Procedure:  COLONOSCOPY    Relevant Problems   CARDIO   (+) Hyperlipidemia   (+) Hypertension      GI/HEPATIC   (+) Fatty liver   (+) GERD (gastroesophageal reflux disease)      NEURO/PSYCH   (+) Anxiety   (+) Depression   (+) History of colon polyps      PULMONARY   (+) Obstructive sleep apnea    Uses CPAP         Anesthesia Plan  ASA Score- 3     Anesthesia Type- IV sedation with anesthesia with ASA Monitors  Additional Monitors:   Airway Plan:           Plan Factors-Exercise tolerance (METS): >4 METS  Chart reviewed  Patient summary reviewed  Patient is not a current smoker  Induction- intravenous  Postoperative Plan-     Informed Consent- Anesthetic plan and risks discussed with patient  I personally reviewed this patient with the CRNA  Discussed and agreed on the Anesthesia Plan with the CRNA  Mercy Goncalves

## 2022-09-12 NOTE — RESULT ENCOUNTER NOTE
I called the patient left a voice message  He just had a hyperplastic polyp    Recall for 5 years in light of previous history of colon polyps-please copy the patient's PCP

## 2022-09-16 LAB
A2 MACROGLOB SERPL-MCNC: 209 MG/DL (ref 110–276)
AFP-TM SERPL-MCNC: 3.4 NG/ML (ref 0–8.4)
ALBUMIN SERPL-MCNC: 4.4 G/DL (ref 3.8–4.8)
ALBUMIN/GLOB SERPL: 2 {RATIO} (ref 1.2–2.2)
ALP SERPL-CCNC: 101 IU/L (ref 44–121)
ALT SERPL W P-5'-P-CCNC: 43 IU/L (ref 0–55)
ALT SERPL-CCNC: 37 IU/L (ref 0–44)
APO A-I SERPL-MCNC: 119 MG/DL (ref 101–178)
AST SERPL-CCNC: 31 IU/L (ref 0–40)
BILIRUB SERPL-MCNC: 0.3 MG/DL (ref 0–1.2)
BILIRUB SERPL-MCNC: 0.3 MG/DL (ref 0–1.2)
BUN SERPL-MCNC: 13 MG/DL (ref 8–27)
BUN/CREAT SERPL: 17 (ref 10–24)
CALCIUM SERPL-MCNC: 9.1 MG/DL (ref 8.6–10.2)
CHLORIDE SERPL-SCNC: 104 MMOL/L (ref 96–106)
CO2 SERPL-SCNC: 23 MMOL/L (ref 20–29)
COMMENT: ABNORMAL
CREAT SERPL-MCNC: 0.78 MG/DL (ref 0.76–1.27)
EGFR: 101 ML/MIN/1.73
FIBROSIS SCORING:: ABNORMAL
FIBROSIS STAGE SERPL QL: ABNORMAL
GGT SERPL-CCNC: 23 IU/L (ref 0–65)
GLOBULIN SER-MCNC: 2.2 G/DL (ref 1.5–4.5)
GLUCOSE SERPL-MCNC: 99 MG/DL (ref 65–99)
HAPTOGLOB SERPL-MCNC: 98 MG/DL (ref 32–363)
INTERPRETATIONS: ABNORMAL
LIVER FIBR SCORE SERPL CALC.FIBROSURE: 0.29 (ref 0–0.21)
NECROINFLAMM ACTIVITY SCORING:: ABNORMAL
NECROINFLAMMATORY ACT GRADE SERPL QL: ABNORMAL
NECROINFLAMMATORY ACT SCORE SERPL: 0.24 (ref 0–0.17)
POTASSIUM SERPL-SCNC: 4.6 MMOL/L (ref 3.5–5.2)
PROT SERPL-MCNC: 6.6 G/DL (ref 6–8.5)
SERVICE CMNT-IMP: ABNORMAL
SODIUM SERPL-SCNC: 140 MMOL/L (ref 134–144)

## 2022-09-26 NOTE — RESULT ENCOUNTER NOTE
LFT's within normal limits, AFP within normal limit   Fibrosis score 0 29 /F1 consistent with portal fibrosis  Will call patient to discuss results

## 2022-09-27 ENCOUNTER — TELEPHONE (OUTPATIENT)
Dept: GASTROENTEROLOGY | Facility: CLINIC | Age: 62
End: 2022-09-27

## 2022-09-27 DIAGNOSIS — K76.0 FATTY LIVER: Primary | ICD-10-CM

## 2022-09-27 NOTE — TELEPHONE ENCOUNTER
Called patient and reviewed recent blood work-LFTs normal   Fibrosis score F1 indicating portal fibrosis  Patient has not had ultrasound done-needs to go to [facility] per insurance    Will fax order to [facility]

## 2022-10-10 ENCOUNTER — TELEPHONE (OUTPATIENT)
Dept: GASTROENTEROLOGY | Facility: CLINIC | Age: 62
End: 2022-10-10

## 2022-10-10 NOTE — TELEPHONE ENCOUNTER
Left voicemail for pt - abdominal US limited by body habitus , mild hepatic steatosis  Recommend lowfat diet, daily walking 20-30 minutes  Recommend f/u OV and repeat labs in 4-6 months